# Patient Record
Sex: MALE | Race: WHITE | NOT HISPANIC OR LATINO | Employment: OTHER | URBAN - METROPOLITAN AREA
[De-identification: names, ages, dates, MRNs, and addresses within clinical notes are randomized per-mention and may not be internally consistent; named-entity substitution may affect disease eponyms.]

---

## 2017-01-21 DIAGNOSIS — J44.89 ASTHMA WITH COPD: ICD-10-CM

## 2017-01-21 RX ORDER — FLUTICASONE PROPIONATE AND SALMETEROL 50; 250 UG/1; UG/1
POWDER RESPIRATORY (INHALATION)
Qty: 60 EACH | Refills: 0 | Status: SHIPPED | OUTPATIENT
Start: 2017-01-21 | End: 2017-02-20 | Stop reason: SDUPTHER

## 2017-02-01 DIAGNOSIS — G47.00 INSOMNIA: ICD-10-CM

## 2017-02-01 RX ORDER — ZOLPIDEM TARTRATE 5 MG/1
TABLET ORAL
Qty: 30 TABLET | Refills: 5 | Status: SHIPPED | OUTPATIENT
Start: 2017-02-01 | End: 2017-06-08 | Stop reason: SDUPTHER

## 2017-02-19 DIAGNOSIS — J44.89 ASTHMA WITH COPD: ICD-10-CM

## 2017-02-20 DIAGNOSIS — J44.89 ASTHMA WITH COPD: ICD-10-CM

## 2017-02-20 RX ORDER — FLUTICASONE PROPIONATE AND SALMETEROL 250; 50 UG/1; UG/1
1 POWDER RESPIRATORY (INHALATION) 2 TIMES DAILY
Qty: 60 EACH | Refills: 11 | Status: SHIPPED | OUTPATIENT
Start: 2017-02-20 | End: 2017-02-24 | Stop reason: SDUPTHER

## 2017-02-20 RX ORDER — FLUTICASONE PROPIONATE AND SALMETEROL 50; 250 UG/1; UG/1
POWDER RESPIRATORY (INHALATION)
Refills: 0 | Status: CANCELLED | OUTPATIENT
Start: 2017-02-20

## 2017-02-21 ENCOUNTER — TELEPHONE (OUTPATIENT)
Dept: PULMONOLOGY | Facility: CLINIC | Age: 77
End: 2017-02-21

## 2017-02-21 NOTE — TELEPHONE ENCOUNTER
----- Message from Jackie Kaminski sent at 2/21/2017 12:34 PM CST -----  Contact: wife  Pt needs Advair called in pt is almost out of med pt can be reached at [phone]///[id number]

## 2017-02-24 DIAGNOSIS — J44.89 ASTHMA WITH COPD: ICD-10-CM

## 2017-02-24 RX ORDER — FLUTICASONE PROPIONATE AND SALMETEROL 250; 50 UG/1; UG/1
1 POWDER RESPIRATORY (INHALATION) 2 TIMES DAILY
Qty: 60 EACH | Refills: 11 | Status: SHIPPED | OUTPATIENT
Start: 2017-02-24 | End: 2017-02-28 | Stop reason: SDUPTHER

## 2017-02-28 DIAGNOSIS — J44.89 ASTHMA WITH COPD: ICD-10-CM

## 2017-02-28 RX ORDER — FLUTICASONE PROPIONATE AND SALMETEROL 250; 50 UG/1; UG/1
1 POWDER RESPIRATORY (INHALATION) 2 TIMES DAILY
Qty: 60 EACH | Refills: 11 | Status: SHIPPED | OUTPATIENT
Start: 2017-02-28 | End: 2018-01-09 | Stop reason: SDUPTHER

## 2017-06-07 ENCOUNTER — TELEPHONE (OUTPATIENT)
Dept: PULMONOLOGY | Facility: CLINIC | Age: 77
End: 2017-06-07

## 2017-06-08 ENCOUNTER — PROCEDURE VISIT (OUTPATIENT)
Dept: PULMONOLOGY | Facility: CLINIC | Age: 77
End: 2017-06-08
Payer: MEDICARE

## 2017-06-08 ENCOUNTER — OFFICE VISIT (OUTPATIENT)
Dept: PULMONOLOGY | Facility: CLINIC | Age: 77
End: 2017-06-08
Payer: MEDICARE

## 2017-06-08 ENCOUNTER — HOSPITAL ENCOUNTER (OUTPATIENT)
Dept: RADIOLOGY | Facility: HOSPITAL | Age: 77
Discharge: HOME | End: 2017-06-08
Attending: INTERNAL MEDICINE
Payer: MEDICARE

## 2017-06-08 VITALS
DIASTOLIC BLOOD PRESSURE: 78 MMHG | HEART RATE: 73 BPM | WEIGHT: 164 LBS | SYSTOLIC BLOOD PRESSURE: 122 MMHG | BODY MASS INDEX: 23.48 KG/M2 | OXYGEN SATURATION: 97 % | HEIGHT: 70 IN

## 2017-06-08 DIAGNOSIS — G47.00 INSOMNIA, UNSPECIFIED TYPE: ICD-10-CM

## 2017-06-08 DIAGNOSIS — J44.89 ASTHMA WITH COPD: ICD-10-CM

## 2017-06-08 DIAGNOSIS — J44.1 COPD EXACERBATION: Primary | ICD-10-CM

## 2017-06-08 PROBLEM — Z86.03 HISTORY OF NEOPLASM OF BLADDER: Status: ACTIVE | Noted: 2017-05-02

## 2017-06-08 PROBLEM — D47.2 MONOCLONAL GAMMOPATHY OF UNKNOWN SIGNIFICANCE (MGUS): Status: ACTIVE | Noted: 2017-05-09

## 2017-06-08 PROCEDURE — 94060 EVALUATION OF WHEEZING: CPT | Mod: 26,59,S$PBB, | Performed by: INTERNAL MEDICINE

## 2017-06-08 PROCEDURE — 99215 OFFICE O/P EST HI 40 MIN: CPT | Mod: 25,S$PBB,, | Performed by: INTERNAL MEDICINE

## 2017-06-08 PROCEDURE — 94799 UNLISTED PULMONARY SVC/PX: CPT | Mod: PBBFAC | Performed by: GENERAL PRACTICE

## 2017-06-08 PROCEDURE — 71020 XR CHEST PA AND LATERAL: CPT | Mod: 26,,, | Performed by: RADIOLOGY

## 2017-06-08 PROCEDURE — 1159F MED LIST DOCD IN RCRD: CPT | Mod: ,,, | Performed by: INTERNAL MEDICINE

## 2017-06-08 PROCEDURE — 1126F AMNT PAIN NOTED NONE PRSNT: CPT | Mod: ,,, | Performed by: INTERNAL MEDICINE

## 2017-06-08 PROCEDURE — 99213 OFFICE O/P EST LOW 20 MIN: CPT | Mod: PBBFAC,25 | Performed by: INTERNAL MEDICINE

## 2017-06-08 PROCEDURE — 99999 PR PBB SHADOW E&M-EST. PATIENT-LVL III: CPT | Mod: PBBFAC,,, | Performed by: INTERNAL MEDICINE

## 2017-06-08 RX ORDER — METHYLPREDNISOLONE 4 MG/1
TABLET ORAL
Qty: 1 PACKAGE | Refills: 1 | Status: SHIPPED | OUTPATIENT
Start: 2017-06-08 | End: 2017-06-29

## 2017-06-08 RX ORDER — AZITHROMYCIN 250 MG/1
250 TABLET, FILM COATED ORAL DAILY
Qty: 6 TABLET | Refills: 1 | Status: SHIPPED | OUTPATIENT
Start: 2017-06-08 | End: 2018-01-09 | Stop reason: ALTCHOICE

## 2017-06-08 RX ORDER — ZOLPIDEM TARTRATE 5 MG/1
5 TABLET ORAL NIGHTLY PRN
Qty: 30 TABLET | Refills: 5 | Status: SHIPPED | OUTPATIENT
Start: 2017-06-08
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
## 2017-06-08 NOTE — PATIENT INSTRUCTIONS
What is COPD?  COPD stands for chronic obstructive pulmonary disease. It means the airways in your lungs are blocked (obstructed). Because of this, it is hard to breathe. You may have trouble with daily activities because of shortness of breath. Over time the shortness of breath usually worsens making it more and more difficult to take care of yourself and take part in activities. Chronic bronchitis and emphysema are two common types of COPD.  What happens in chronic bronchitis?    The cells in the airways make more mucus than normal. The mucus builds up, narrowing the airways. This means less air travels into and out of the lungs. The lining of the airways may also become inflamed (swollen) and causes the airways to narrow even more.        What happens in emphysema?    The small airways are damaged and lose their stretchiness. The airways collapse when you exhale, causing air to get trapped in the air sacs. This means that less oxygen enters the blood vessels and less oxygen is delivered to all of the cells of your body. This makes it hard to breathe.     Damage to cilia    Cilia are small hairs that line and protect the airways. Smoking damages the cilia. Damaged cilia cant sweep mucus and particles away. Some of the cilia are destroyed. This damage worsens COPD.  How did I get COPD?  Most people get COPD from smoking. Cigarette smoke damages lungs, which can develop into COPD over many years.  How COPD affects you  COPD makes you work harder to breathe. Air may get trapped in the lungs, which prevents your lungs from filling completely with fresh oxygen-filled air when you inhale (breathe in). It's harder to take deep breaths especially when you are active and start breathing faster. Over time, your lungs may become enlarged filling the lung with air that does not transfer oxygen into the blood. These problems cause you to have shortness of breath (also called dyspnea). Wheezing (hoarse, whistling breathing),  chronic cough, and fatigue (feeling tired and worn out) are also common.   Date Last Reviewed: 5/1/2016  © 5769-9956 [employer]. [address] [place] [address], [place] [address]. All rights reserved. This information is not intended as a substitute for professional medical care. Always follow your healthcare professional's instructions.

## 2017-06-29 LAB
POST FEF 25 75: 0.84 L/S (ref 1.4–2.95)
POST FET 100: 11.73 S
POST FEV1 FVC: 64 %
POST FEV1: 1.79 L (ref 2.58–3.35)
POST FIF 50: 4.38 L/S
POST FVC: 2.8 L (ref 3.64–4.54)
POST PEF: 5.67 L/S (ref 6.57–8.82)
PRE FEF 25 75: 0.76 L/S (ref 1.4–2.95)
PRE FET 100: 12.44 S
PRE FEV1 FVC: 62 %
PRE FEV1: 1.76 L (ref 2.58–3.35)
PRE FIF 50: 4.96 L/S
PRE FVC: 2.84 L (ref 3.64–4.54)
PRE PEF: 5.46 L/S (ref 6.57–8.82)
PREDICTED FEV1 FVC: 72.78 % (ref 67.94–77.62)
PREDICTED FEV1: 2.97 L (ref 2.58–3.35)
PREDICTED FVC: 4.09 L (ref 3.64–4.54)
PROVOCATION PROTOCOL: ABNORMAL

## 2017-08-22 DIAGNOSIS — J44.89 ASTHMA WITH COPD: ICD-10-CM

## 2017-08-25 RX ORDER — ALBUTEROL SULFATE 90 UG/1
AEROSOL, METERED RESPIRATORY (INHALATION)
Qty: 9 EACH | Refills: 11 | Status: SHIPPED | OUTPATIENT
Start: 2017-08-25 | End: 2018-01-09 | Stop reason: SDUPTHER

## 2017-08-28 ENCOUNTER — TELEPHONE (OUTPATIENT)
Dept: PULMONOLOGY | Facility: CLINIC | Age: 77
End: 2017-08-28

## 2017-11-29 DIAGNOSIS — J44.89 ASTHMA WITH COPD: ICD-10-CM

## 2017-11-30 RX ORDER — IPRATROPIUM BROMIDE AND ALBUTEROL SULFATE 2.5; .5 MG/3ML; MG/3ML
3 SOLUTION RESPIRATORY (INHALATION) EVERY 6 HOURS
Qty: 120 VIAL | Refills: 12 | Status: SHIPPED | OUTPATIENT
Start: 2017-11-30 | End: 2018-01-09 | Stop reason: SDUPTHER

## 2017-12-07 ENCOUNTER — TELEPHONE (OUTPATIENT)
Dept: PULMONOLOGY | Facility: CLINIC | Age: 77
End: 2017-12-07

## 2017-12-07 NOTE — TELEPHONE ENCOUNTER
Returned pt call to inform of active prescription for duo neb already called to [facility] pharmacy on 11/30/2017 with 12 refills.

## 2018-01-09 ENCOUNTER — OFFICE VISIT (OUTPATIENT)
Dept: PULMONOLOGY | Facility: CLINIC | Age: 78
End: 2018-01-09
Payer: MEDICARE

## 2018-01-09 VITALS
SYSTOLIC BLOOD PRESSURE: 120 MMHG | HEART RATE: 86 BPM | WEIGHT: 164 LBS | BODY MASS INDEX: 23.48 KG/M2 | OXYGEN SATURATION: 95 % | HEIGHT: 70 IN | DIASTOLIC BLOOD PRESSURE: 62 MMHG | RESPIRATION RATE: 18 BRPM

## 2018-01-09 DIAGNOSIS — J44.89 ASTHMA WITH COPD: ICD-10-CM

## 2018-01-09 DIAGNOSIS — Q85.9: ICD-10-CM

## 2018-01-09 PROBLEM — Z85.51 HISTORY OF BLADDER CANCER: Status: ACTIVE | Noted: 2017-05-02

## 2018-01-09 PROBLEM — R82.71 ASYMPTOMATIC BACTERIURIA: Status: ACTIVE | Noted: 2018-01-03

## 2018-01-09 PROBLEM — R11.2 NAUSEA AND VOMITING: Status: ACTIVE | Noted: 2018-01-02

## 2018-01-09 PROBLEM — G82.20 PARAPLEGIA: Status: ACTIVE | Noted: 2018-01-03

## 2018-01-09 PROBLEM — J44.1 COPD EXACERBATION: Status: ACTIVE | Noted: 2017-12-31

## 2018-01-09 PROBLEM — I73.9 PVD (PERIPHERAL VASCULAR DISEASE): Status: ACTIVE | Noted: 2017-12-31

## 2018-01-09 PROBLEM — E78.00 PURE HYPERCHOLESTEROLEMIA: Status: ACTIVE | Noted: 2017-12-31

## 2018-01-09 PROCEDURE — 99214 OFFICE O/P EST MOD 30 MIN: CPT | Mod: S$PBB,,, | Performed by: INTERNAL MEDICINE

## 2018-01-09 PROCEDURE — 99213 OFFICE O/P EST LOW 20 MIN: CPT | Mod: PBBFAC | Performed by: INTERNAL MEDICINE

## 2018-01-09 PROCEDURE — 99999 PR PBB SHADOW E&M-EST. PATIENT-LVL III: CPT | Mod: PBBFAC,,, | Performed by: INTERNAL MEDICINE

## 2018-01-09 RX ORDER — TIOTROPIUM BROMIDE 18 UG/1
18 CAPSULE ORAL; RESPIRATORY (INHALATION) DAILY
Qty: 30 CAPSULE | Refills: 11 | Status: SHIPPED | OUTPATIENT
Start: 2018-01-09 | End: 2018-08-21

## 2018-01-09 RX ORDER — POLYETHYLENE GLYCOL 3350 17 G/17G
17 POWDER, FOR SOLUTION ORAL
COMMUNITY

## 2018-01-09 RX ORDER — LORATADINE 10 MG/1
10 TABLET ORAL
COMMUNITY

## 2018-01-09 RX ORDER — TIOTROPIUM BROMIDE 18 UG/1
CAPSULE ORAL; RESPIRATORY (INHALATION)
COMMUNITY
Start: 2018-01-03 | End: 2018-01-09 | Stop reason: SDUPTHER

## 2018-01-09 RX ORDER — FLUTICASONE PROPIONATE AND SALMETEROL 250; 50 UG/1; UG/1
1 POWDER RESPIRATORY (INHALATION) 2 TIMES DAILY
Qty: 60 EACH | Refills: 11 | Status: SHIPPED | OUTPATIENT
Start: 2018-01-09 | End: 2018-08-21

## 2018-01-09 RX ORDER — IPRATROPIUM BROMIDE AND ALBUTEROL SULFATE 2.5; .5 MG/3ML; MG/3ML
3 SOLUTION RESPIRATORY (INHALATION) EVERY 6 HOURS
Qty: 120 VIAL | Refills: 12 | Status: SHIPPED | OUTPATIENT
Start: 2018-01-09 | End: 2018-08-21

## 2018-01-09 RX ORDER — ALBUTEROL SULFATE 90 UG/1
2 AEROSOL, METERED RESPIRATORY (INHALATION) EVERY 4 HOURS PRN
Qty: 9 EACH | Refills: 11 | Status: SHIPPED | OUTPATIENT
Start: 2018-01-09 | End: 2018-08-21
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
## 2018-06-20 ENCOUNTER — HOSPITAL ENCOUNTER (OUTPATIENT)
Dept: RADIOLOGY | Facility: HOSPITAL | Age: 78
Discharge: HOME | End: 2018-06-20
Attending: INTERNAL MEDICINE
Payer: MEDICARE

## 2018-06-20 ENCOUNTER — TELEPHONE (OUTPATIENT)
Dept: PULMONOLOGY | Facility: CLINIC | Age: 78
End: 2018-06-20

## 2018-06-20 DIAGNOSIS — J44.89 ASTHMA WITH COPD: ICD-10-CM

## 2018-06-20 PROCEDURE — 71046 X-RAY EXAM CHEST 2 VIEWS: CPT | Mod: 26,,, | Performed by: RADIOLOGY

## 2018-06-20 PROCEDURE — 71046 X-RAY EXAM CHEST 2 VIEWS: CPT | Mod: TC

## 2018-06-29 ENCOUNTER — TELEPHONE (OUTPATIENT)
Dept: PULMONOLOGY | Facility: CLINIC | Age: 78
End: 2018-06-29

## 2018-06-29 NOTE — TELEPHONE ENCOUNTER
----- Message from Katya Rocha sent at 6/29/2018  1:35 PM CDT -----  Contact: pt  Please call pt @ [phone] regarding appts, pt states he need all three appt together, pt would like appt around July 17 or that week in the afternoon.

## 2018-08-21 ENCOUNTER — OFFICE VISIT (OUTPATIENT)
Dept: PULMONOLOGY | Facility: CLINIC | Age: 78
End: 2018-08-21
Payer: MEDICARE

## 2018-08-21 ENCOUNTER — PROCEDURE VISIT (OUTPATIENT)
Dept: PULMONOLOGY | Facility: CLINIC | Age: 78
End: 2018-08-21
Payer: MEDICARE

## 2018-08-21 VITALS
RESPIRATION RATE: 18 BRPM | OXYGEN SATURATION: 94 % | HEART RATE: 89 BPM | DIASTOLIC BLOOD PRESSURE: 56 MMHG | BODY MASS INDEX: 25.77 KG/M2 | HEIGHT: 70 IN | SYSTOLIC BLOOD PRESSURE: 108 MMHG | WEIGHT: 180 LBS

## 2018-08-21 DIAGNOSIS — Q85.9: Primary | ICD-10-CM

## 2018-08-21 DIAGNOSIS — J44.89 ASTHMA WITH COPD: ICD-10-CM

## 2018-08-21 LAB
BRPFT: ABNORMAL
FEF 25 75 CHG: -8 %
FEF 25 75 LLN: 0.88
FEF 25 75 POST REF: 41.1 %
FEF 25 75 POST: 0.9 L/S (ref 0.88–4.08)
FEF 25 75 PRE REF: 44.7 %
FEF 25 75 PRE: 0.98 L/S (ref 0.88–4.08)
FEF 25 75 REF: 2.19
FET100 CHG: 25.5 %
FET100 POST: 11.56 SEC
FET100 PRE: 9.21 SEC
FEV1 CHG: 4.1 %
FEV1 FVC CHG: -2.7 %
FEV1 FVC LLN: 61
FEV1 FVC POST REF: 80.1 %
FEV1 FVC POST: 60.23 % (ref 60.98–87.9)
FEV1 FVC PRE REF: 82.3 %
FEV1 FVC PRE: 61.87 % (ref 60.98–87.9)
FEV1 FVC REF: 75
FEV1 LLN: 2.12
FEV1 POST REF: 67.8 %
FEV1 POST: 2.04 L (ref 2.12–3.84)
FEV1 PRE REF: 65.1 %
FEV1 PRE: 1.96 L (ref 2.12–3.84)
FEV1 REF: 3.01
FEV6 CHG: 2.6 %
FEV6 LLN: 3.07
FEV6 POST REF: 77.3 %
FEV6 POST: 3.07 L (ref 3.07–4.88)
FEV6 PRE REF: 75.4 %
FEV6 PRE: 2.99 L (ref 3.07–4.88)
FEV6 REF: 3.97
FVC CHG: 6.9 %
FVC LLN: 2.94
FVC POST REF: 84 %
FVC POST: 3.39 L (ref 2.94–5.14)
FVC PRE REF: 78.5 %
FVC PRE: 3.17 L (ref 2.94–5.14)
FVC REF: 4.03
MVV LLN: 101
MVV REF: 119
PEF CHG: -8.7 %
PEF LLN: 5.51
PEF POST REF: 73.4 %
PEF POST: 5.75 L/S (ref 5.51–10.15)
PEF PRE REF: 80.5 %
PEF PRE: 6.3 L/S (ref 5.51–10.15)
PEF REF: 7.83

## 2018-08-21 PROCEDURE — 94060 EVALUATION OF WHEEZING: CPT | Mod: 26,S$PBB,, | Performed by: INTERNAL MEDICINE

## 2018-08-21 PROCEDURE — 94060 EVALUATION OF WHEEZING: CPT | Mod: PBBFAC

## 2018-08-21 PROCEDURE — 99999 PR PBB SHADOW E&M-EST. PATIENT-LVL IV: CPT | Mod: PBBFAC,,, | Performed by: INTERNAL MEDICINE

## 2018-08-21 PROCEDURE — 99214 OFFICE O/P EST MOD 30 MIN: CPT | Mod: 25,S$PBB,, | Performed by: INTERNAL MEDICINE

## 2018-08-21 PROCEDURE — 99214 OFFICE O/P EST MOD 30 MIN: CPT | Mod: PBBFAC,25 | Performed by: INTERNAL MEDICINE

## 2018-08-21 RX ORDER — IPRATROPIUM BROMIDE AND ALBUTEROL SULFATE 2.5; .5 MG/3ML; MG/3ML
3 SOLUTION RESPIRATORY (INHALATION) EVERY 6 HOURS
Qty: 120 VIAL | Refills: 12 | Status: SHIPPED | OUTPATIENT
Start: 2018-08-21 | End: 2019-10-29 | Stop reason: SDUPTHER

## 2018-08-21 RX ORDER — FLUTICASONE PROPIONATE AND SALMETEROL 250; 50 UG/1; UG/1
1 POWDER RESPIRATORY (INHALATION) 2 TIMES DAILY
Qty: 60 EACH | Refills: 11 | Status: SHIPPED | OUTPATIENT
Start: 2018-08-21 | End: 2019-06-28

## 2018-08-21 RX ORDER — ALBUTEROL SULFATE 90 UG/1
2 AEROSOL, METERED RESPIRATORY (INHALATION) EVERY 4 HOURS PRN
Qty: 9 EACH | Refills: 11 | Status: SHIPPED | OUTPATIENT
Start: 2018-08-21 | End: 2019-10-29 | Stop reason: CLARIF

## 2018-08-21 RX ORDER — FLUOROMETHOLONE 1 MG/ML
2 SUSPENSION/ DROPS OPHTHALMIC DAILY
COMMUNITY
Start: 2018-08-01
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
## 2019-04-08 ENCOUNTER — TELEPHONE (OUTPATIENT)
Dept: ADMINISTRATIVE | Facility: CLINIC | Age: 79
End: 2019-04-08

## 2019-04-08 NOTE — TELEPHONE ENCOUNTER
Home Health SOC 03/20/2019 - 05/18/2019 with [facility] ([place] - Dr. Nishi Al. Patient received SN, PT, OT, MSW and HHA services.

## 2019-04-25 ENCOUNTER — TELEPHONE (OUTPATIENT)
Dept: INTERNAL MEDICINE | Facility: CLINIC | Age: 79
End: 2019-04-25

## 2019-04-25 NOTE — TELEPHONE ENCOUNTER
----- Message from Ana Garsia sent at 4/25/2019 12:54 PM CDT -----  Contact: [facility]  Stated she' following up on some orders for the pt, she can be reached at [phone]

## 2019-06-19 ENCOUNTER — TELEPHONE (OUTPATIENT)
Dept: PULMONOLOGY | Facility: CLINIC | Age: 79
End: 2019-06-19

## 2019-06-19 NOTE — TELEPHONE ENCOUNTER
Returned patient call spoke with wife requested change in medication, patient currently on Advair, wife  Would like to know if change Symbicort covered by ins[employer] advised wife  Dr Stark out of office until 06/24/19,   Wife stated understanding    ----- Message from Vickie Rudd sent at 6/19/2019  1:26 PM CDT -----  Contact: Pt/Wife  Please give pt wife a call at .[phone] (home) regarding some questions about the pt medication and want to see if it can be changed

## 2019-06-28 ENCOUNTER — TELEPHONE (OUTPATIENT)
Dept: PULMONOLOGY | Facility: CLINIC | Age: 79
End: 2019-06-28

## 2019-06-28 DIAGNOSIS — J44.89 ASTHMA WITH COPD: ICD-10-CM

## 2019-06-28 DIAGNOSIS — J44.89 ASTHMA WITH COPD: Primary | ICD-10-CM

## 2019-06-28 RX ORDER — BUDESONIDE AND FORMOTEROL FUMARATE DIHYDRATE 160; 4.5 UG/1; UG/1
2 AEROSOL RESPIRATORY (INHALATION) EVERY 12 HOURS
Qty: 1 INHALER | Refills: 11 | Status: SHIPPED | OUTPATIENT
Start: 2019-06-28 | End: 2020-06-27

## 2019-06-28 RX ORDER — BUDESONIDE AND FORMOTEROL FUMARATE DIHYDRATE 160; 4.5 UG/1; UG/1
2 AEROSOL RESPIRATORY (INHALATION) EVERY 12 HOURS
Qty: 1 INHALER | Refills: 11 | Status: SHIPPED | OUTPATIENT
Start: 2019-06-28 | End: 2019-06-28 | Stop reason: SDUPTHER

## 2019-06-28 NOTE — TELEPHONE ENCOUNTER
----- Message from Crissy Novoa LPN sent at 6/27/2019 11:09 AM CDT -----  Contact: Patient      ----- Message -----  From: Opal Emmanuel  Sent: 6/27/2019  10:57 AM  To: [facility] HERNANDEZ Staff    [facility] doctor would like to switch from advair to Symbicort,so the patient wouldn't have to pay for his medication through the [facility], if it's okay with Dr. Stark. Please call to advise at Ph [phone]

## 2019-07-01 NOTE — TELEPHONE ENCOUNTER
Spoke to patient, advised that rx for Symbicort had been placed in the mail. Patient voiced understanding.

## 2019-08-19 ENCOUNTER — TELEPHONE (OUTPATIENT)
Dept: PULMONOLOGY | Facility: CLINIC | Age: 79
End: 2019-08-19

## 2019-08-19 NOTE — TELEPHONE ENCOUNTER
----- Message from Jackie Kaminski sent at 8/19/2019  1:03 PM CDT -----  Contact: pt  The pt needs to reschedule his 08/20/2019 appt, I wasn't able to get all the appts on the same day, please call the pt at [phone] to reschedule, can be reached at [phone]///[id number]

## 2019-09-27 ENCOUNTER — TELEPHONE (OUTPATIENT)
Dept: PULMONOLOGY | Facility: CLINIC | Age: 79
End: 2019-09-27

## 2019-10-21 ENCOUNTER — EXTERNAL HOME HEALTH (OUTPATIENT)
Dept: HOME HEALTH SERVICES | Facility: HOSPITAL | Age: 79
End: 2019-10-21
Payer: MEDICARE

## 2019-10-28 ENCOUNTER — CLINICAL SUPPORT (OUTPATIENT)
Dept: PULMONOLOGY | Facility: CLINIC | Age: 79
End: 2019-10-28
Payer: MEDICARE

## 2019-10-28 ENCOUNTER — OFFICE VISIT (OUTPATIENT)
Dept: PULMONOLOGY | Facility: CLINIC | Age: 79
End: 2019-10-28
Payer: MEDICARE

## 2019-10-28 VITALS
SYSTOLIC BLOOD PRESSURE: 118 MMHG | OXYGEN SATURATION: 96 % | BODY MASS INDEX: 25.77 KG/M2 | HEIGHT: 70 IN | HEART RATE: 87 BPM | RESPIRATION RATE: 20 BRPM | DIASTOLIC BLOOD PRESSURE: 62 MMHG | WEIGHT: 180 LBS

## 2019-10-28 DIAGNOSIS — J44.89 ASTHMA WITH COPD: ICD-10-CM

## 2019-10-28 DIAGNOSIS — Q85.9: ICD-10-CM

## 2019-10-28 DIAGNOSIS — G82.20 PARAPLEGIA: ICD-10-CM

## 2019-10-28 DIAGNOSIS — J44.89 ASTHMA WITH COPD: Primary | ICD-10-CM

## 2019-10-28 LAB
BRPFT: ABNORMAL
FEF 25 75 LLN: 0.85
FEF 25 75 PRE REF: 37 %
FEF 25 75 REF: 2.14
FEV1 FVC LLN: 61
FEV1 FVC PRE REF: 82.1 %
FEV1 FVC REF: 75
FEV1 LLN: 2.09
FEV1 PRE REF: 52.9 %
FEV1 REF: 2.98
FVC LLN: 2.91
FVC PRE REF: 63.9 %
FVC REF: 4
PEF LLN: 5.3
PEF PRE REF: 65.6 %
PEF REF: 7.62
PRE FEF 25 75: 0.79 L/S (ref 0.85–3.44)
PRE FET 100: 6.76 SEC
PRE FEV1 FVC: 61.59 % (ref 60.65–89.36)
PRE FEV1: 1.58 L (ref 2.09–3.87)
PRE FVC: 2.56 L (ref 2.91–5.09)
PRE PEF: 5 L/S (ref 5.3–9.94)

## 2019-10-28 PROCEDURE — 99999 PR PBB SHADOW E&M-EST. PATIENT-LVL IV: CPT | Mod: PBBFAC,,, | Performed by: NURSE PRACTITIONER

## 2019-10-28 PROCEDURE — 94010 BREATHING CAPACITY TEST: ICD-10-PCS | Mod: 26,S$PBB,, | Performed by: INTERNAL MEDICINE

## 2019-10-28 PROCEDURE — 94010 BREATHING CAPACITY TEST: CPT | Mod: PBBFAC

## 2019-10-28 PROCEDURE — 99214 OFFICE O/P EST MOD 30 MIN: CPT | Mod: 25,S$PBB,, | Performed by: NURSE PRACTITIONER

## 2019-10-28 PROCEDURE — 99999 PR PBB SHADOW E&M-EST. PATIENT-LVL IV: ICD-10-PCS | Mod: PBBFAC,,, | Performed by: NURSE PRACTITIONER

## 2019-10-28 PROCEDURE — 99214 PR OFFICE/OUTPT VISIT, EST, LEVL IV, 30-39 MIN: ICD-10-PCS | Mod: 25,S$PBB,, | Performed by: NURSE PRACTITIONER

## 2019-10-28 PROCEDURE — 99214 OFFICE O/P EST MOD 30 MIN: CPT | Mod: PBBFAC,25 | Performed by: NURSE PRACTITIONER

## 2019-10-28 PROCEDURE — 94010 BREATHING CAPACITY TEST: CPT | Mod: 26,S$PBB,, | Performed by: INTERNAL MEDICINE

## 2019-10-28 RX ORDER — PREDNISONE 20 MG/1
TABLET ORAL
COMMUNITY
Start: 2019-10-26

## 2019-10-28 RX ORDER — BENZONATATE 100 MG/1
CAPSULE ORAL
COMMUNITY
Start: 2019-10-26
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
## 2019-10-28 NOTE — LETTER
October 29, 2019      Reuben Stark MD  [address]  [place] [address]           [facility]  [address] [place] [address]  Phone: [phone]  Fax: [phone]          Patient: Jomar Washington   MR Number: [id number]   YOB: 1940   Date of Visit: 10/28/2019       Dear Dr. Reuben Stark:    Thank you for referring Jomar Washington to me for evaluation. Attached you will find relevant portions of my assessment and plan of care.    If you have questions, please do not hesitate to call me. I look forward to following Jomar Washington along with you.    Sincerely,    Gina Newsome, NP    Enclosure  CC:  No Recipients    If you would like to receive this communication electronically, please contact [email or web address] or [phone] to request more information on [employer] Link access.    For providers and/or their staff who would like to refer a patient to [facility], please contact us through our one-stop-shop provider referral line, [facility] , at [phone].    If you feel you have received this communication in error or would no longer like to receive these types of communications, please e-mail [email or web address]

## 2019-10-29 RX ORDER — ALBUTEROL SULFATE 90 UG/1
2 AEROSOL, METERED RESPIRATORY (INHALATION) EVERY 4 HOURS PRN
Qty: 3 INHALER | Refills: 4 | Status: SHIPPED | OUTPATIENT
Start: 2019-10-29 | End: 2020-10-28

## 2019-10-29 RX ORDER — IPRATROPIUM BROMIDE AND ALBUTEROL SULFATE 2.5; .5 MG/3ML; MG/3ML
3 SOLUTION RESPIRATORY (INHALATION) EVERY 6 HOURS
Qty: 360 VIAL | Refills: 3 | Status: SHIPPED | OUTPATIENT
Start: 2019-10-29 | End: 2020-10-28

## 2019-10-29 NOTE — ASSESSMENT & PLAN NOTE
Stable on Symbicort, albuterol inhaler, DuoNeb.  Spirometry 10/28/2019 revealed poor quality test. Spirometry data may be inaccurate. Baseline spirometry suggests moderately severe expiratory airflow obstruction.  Moderate restriction may also be present based on reduced forced vital capacity.   Continue current treatment  Follow-up 1 year October 2020 review chest x-ray spirometry, follow-up sooner if needed

## 2019-10-29 NOTE — ASSESSMENT & PLAN NOTE
Followed by Dr. Lincoln fowler since 2013, chest x-ray unable to be accomplished in clinic per radiology tech, paraplegia can wheelchair.  Chest x-ray done last week at [facility], patient told malcolm, report requested.

## 2020-11-21 ENCOUNTER — LAB VISIT (OUTPATIENT)
Dept: LAB | Facility: HOSPITAL | Age: 80
End: 2020-11-21
Attending: FAMILY MEDICINE
Payer: MEDICARE

## 2020-11-21 DIAGNOSIS — R30.0 DYSURIA: Primary | ICD-10-CM

## 2020-11-21 PROCEDURE — 87088 URINE BACTERIA CULTURE: CPT

## 2020-11-21 PROCEDURE — 87186 SC STD MICRODIL/AGAR DIL: CPT

## 2020-11-21 PROCEDURE — 87086 URINE CULTURE/COLONY COUNT: CPT

## 2020-11-21 PROCEDURE — 87077 CULTURE AEROBIC IDENTIFY: CPT

## 2020-11-24 LAB — BACTERIA UR CULT: ABNORMAL
